# Patient Record
(demographics unavailable — no encounter records)

---

## 2025-04-07 NOTE — ASSESSMENT
[FreeTextEntry1] : 48year-old with severe MORRO currently on CPAP therapy here for an annual follow up and for clearance for position at [facility]. Therapeutic and compliance data download reveals usage 100% of days with an average use of 4 hours and 46 minutes. Therapy based AHI is 3.5/hr on 10 - 16 cm H2O, with a 90th percentile pressure of 15.3 cm H2O. The patient is experiencing benefit from CPAP and should continue to use. He will follow up in 1 year or sooner if needed.

## 2025-04-07 NOTE — HISTORY OF PRESENT ILLNESS
[FreeTextEntry1] : 48year-old with severe MORRO currently on CPAP therapy here for an annual follow up and for clearance for position at [facility].  Coexisting medical conditions include hypertension and hyperlipidemia.  PSG (5/26/2015): AHI 67.5/hr T90 7.8%  CPAP titration (7/12/2015) best tested pressure of 17 cm H2O Split study (7/16/2016) AHI 59.3/hr, best tested pressure of 13 cm H2O CPAP titration (9/25/2016) optimal pressure 14 cm H2O Split study (8/2/2017): AHI 81.4, normalized with 12 cmH2O.  Device: DreamStation 2 (Replaced, initial 8/25/2017) Settings: APAP 10 - 16 cm H2O Mask: Nasal DME: [facility].  Patient reports he is adherent with CPAP therapy, uses it nightly and benefits from use.

## 2025-04-07 NOTE — HISTORY OF PRESENT ILLNESS
[FreeTextEntry1] : 48year-old with severe MORRO currently on CPAP therapy here for an annual follow up and for clearance for position at [facility].  Coexisting medical conditions include hypertension and hyperlipidemia.  PSG (5/26/2015): AHI 67.5/hr T90 7.8%  CPAP titration (7/12/2015) best tested pressure of 17 cm H2O Split study (7/16/2016) AHI 59.3/hr, best tested pressure of 13 cm H2O CPAP titration (9/25/2016) optimal pressure 14 cm H2O Split study (8/2/2017): AHI 81.4, normalized with 12 cmH2O.  Device: DreamStation 2 (Replaced, initial 8/25/2017) Settings: APAP 10 - 16 cm H2O Mask: Nasal DME: [facility].  Patient reports he is adherent with CPAP therapy, uses it nightly and benefits from use.

## 2025-04-07 NOTE — ASSESSMENT
[FreeTextEntry1] : 48year-old with severe MORRO currently on CPAP therapy here for an annual follow up and for clearance for position at [facility]. Therapeutic and compliance data download reveals usage 100% of days with an average use of 4 hours and 46 minutes. Therapy based AHI is 3.5/hr on 10 - 16 cm H2O, with a 90th percentile pressure of 15.3 cm H2O. The patient is experiencing benefit from CPAP and should continue to use. He will follow up in 1 year or sooner if needed.

## 2025-04-07 NOTE — PHYSICAL EXAM
[General Appearance - Well Developed] : well developed [Normal Appearance] : normal appearance [Normal Conjunctiva] : the conjunctiva exhibited no abnormalities [Neck Appearance] : the appearance of the neck was normal [] : no respiratory distress [Abnormal Walk] : normal gait [Skin Color & Pigmentation] : normal skin color and pigmentation [No Focal Deficits] : no focal deficits [Oriented To Time, Place, And Person] : oriented to person, place, and time [Mood] : the mood was normal [Exaggerated Use Of Accessory Muscles For Inspiration] : no accessory muscle use